# Patient Record
Sex: MALE | Race: BLACK OR AFRICAN AMERICAN | NOT HISPANIC OR LATINO
[De-identification: names, ages, dates, MRNs, and addresses within clinical notes are randomized per-mention and may not be internally consistent; named-entity substitution may affect disease eponyms.]

---

## 2023-12-20 ENCOUNTER — APPOINTMENT (OUTPATIENT)
Dept: UROLOGY | Facility: CLINIC | Age: 63
End: 2023-12-20

## 2023-12-20 PROBLEM — Z00.00 ENCOUNTER FOR PREVENTIVE HEALTH EXAMINATION: Status: ACTIVE | Noted: 2023-12-20

## 2024-01-02 ENCOUNTER — APPOINTMENT (OUTPATIENT)
Dept: UROLOGY | Facility: CLINIC | Age: 64
End: 2024-01-02
Payer: COMMERCIAL

## 2024-01-02 VITALS
DIASTOLIC BLOOD PRESSURE: 84 MMHG | WEIGHT: 165 LBS | OXYGEN SATURATION: 96 % | HEIGHT: 70 IN | SYSTOLIC BLOOD PRESSURE: 131 MMHG | BODY MASS INDEX: 23.62 KG/M2 | HEART RATE: 72 BPM | TEMPERATURE: 97.2 F

## 2024-01-02 LAB
BILIRUB UR QL STRIP: NORMAL
CLARITY UR: CLEAR
COLLECTION METHOD: NORMAL
GLUCOSE UR-MCNC: NORMAL
HCG UR QL: 0.2 EU/DL
HGB UR QL STRIP.AUTO: NORMAL
KETONES UR-MCNC: NORMAL
LEUKOCYTE ESTERASE UR QL STRIP: NORMAL
NITRITE UR QL STRIP: NORMAL
PH UR STRIP: 5.5
PROT UR STRIP-MCNC: NORMAL
SP GR UR STRIP: 1.02

## 2024-01-02 PROCEDURE — 99204 OFFICE O/P NEW MOD 45 MIN: CPT

## 2024-01-02 PROCEDURE — 81003 URINALYSIS AUTO W/O SCOPE: CPT | Mod: QW

## 2024-01-02 NOTE — PHYSICAL EXAM
[General Appearance - Well Developed] : well developed [No Focal Deficits] : no focal deficits [Oriented To Time, Place, And Person] : oriented to person, place, and time [Normal Appearance] : normal appearance [Well Groomed] : well groomed [General Appearance - In No Acute Distress] : no acute distress [Edema] : no peripheral edema [Respiration, Rhythm And Depth] : normal respiratory rhythm and effort [Exaggerated Use Of Accessory Muscles For Inspiration] : no accessory muscle use [Abdomen Soft] : soft [Abdomen Tenderness] : non-tender [Costovertebral Angle Tenderness] : no ~M costovertebral angle tenderness [Urinary Bladder Findings] : the bladder was normal on palpation [Testes Tenderness] : no tenderness of the testes [Testes Mass (___cm)] : there were no testicular masses [No Prostate Nodules] : no prostate nodules [Prostate Size ___ gm] : prostate size [unfilled] gm [Normal Station and Gait] : the gait and station were normal for the patient's age [] : no rash [Affect] : the affect was normal [Mood] : the mood was normal [de-identified] : Palpable plaque on the ventral penile shaft

## 2024-01-02 NOTE — HISTORY OF PRESENT ILLNESS
[FreeTextEntry1] : 63 year old male presents with ED. States difficulty getting and maintaining erection since he felt a "pop" during intercourse a long time. Since then, he can achieve orgasm but states he does not have forceful ejaculation, it "drips out". His libido is unchanged. He denies pain with erection, but has some pain with ejaculation. Denies curvature, dysuria, hematuria, or hematospermia.   His urination is normal. Does not think he has had a PSA. Denies FH of  malignancies. His father  from cirrhosis.   UA: neg

## 2024-01-02 NOTE — ASSESSMENT
[FreeTextEntry1] : 63 year old male presents with ED and ejaculatory dysfunction. His primary concern is however related to a potential injury that happened a while ago when he heard a popping sound. He has had ED and weak or non-existent ejaculation since then. He does have a palpable plaque on the ventral proximal penile shaft, which could indicate Peyronie's disease, which could affect erectile function. He denies curvature though, so unclear if this is clinically significant. We discussed possibility of urethral stricture formation after a potential injury. He does not have any urinary complaints, however. We discussed potential evaluation with cystoscopy as patient feels something "tore" on the inside. We discussed imaging with MRI, but this may not be as useful initially.   We discussed PSA screening per AUA guidelines, which does not recommend routine PSA screening in men under 40 years of age, recommends PSA screening in men aged 40 to 54 only if  or family history of metastatic adenocarcinoma, recommends shared decision making in men aged 55-69, recommends against routine PSA screening in men aged 70+. Time interval for PSA screening is every 2 years if PSA values are normal and patient is not high risk.  - F/U PSA  - F/U for cystoscopy

## 2024-01-03 LAB — PSA SERPL-MCNC: 0.91 NG/ML

## 2024-01-17 ENCOUNTER — APPOINTMENT (OUTPATIENT)
Dept: UROLOGY | Facility: CLINIC | Age: 64
End: 2024-01-17
Payer: COMMERCIAL

## 2024-01-17 VITALS — SYSTOLIC BLOOD PRESSURE: 113 MMHG | DIASTOLIC BLOOD PRESSURE: 71 MMHG | HEART RATE: 71 BPM | TEMPERATURE: 97 F

## 2024-01-17 DIAGNOSIS — Z12.5 ENCOUNTER FOR SCREENING FOR MALIGNANT NEOPLASM OF PROSTATE: ICD-10-CM

## 2024-01-17 DIAGNOSIS — N52.9 MALE ERECTILE DYSFUNCTION, UNSPECIFIED: ICD-10-CM

## 2024-01-17 DIAGNOSIS — N53.19 OTHER EJACULATORY DYSFUNCTION: ICD-10-CM

## 2024-01-17 PROCEDURE — 52000 CYSTOURETHROSCOPY: CPT
